# Patient Record
(demographics unavailable — no encounter records)

---

## 2024-11-07 NOTE — HISTORY OF PRESENT ILLNESS
[FreeTextEntry1] : Dasia presents for f/u of urinary urgency, UUI, and GUSM. She has been using vaginal estrogen therapy with some improvement of her urgency. She has also implemented behavioral/dietary changes. She noticed that citrus foods were particularly irritating and triggering of her symptoms.

## 2024-11-07 NOTE — DISCUSSION/SUMMARY
[FreeTextEntry1] : #Urinary urgency #GUSM - Continue with vaginal estrogen therapy TIW - Continue with dietary/fluid changes - Follow-up in 5-6 months or PRN